# Patient Record
(demographics unavailable — no encounter records)

---

## 2025-04-30 NOTE — PAST MEDICAL HISTORY
[Menarche Age ____] : age at menarche was [unfilled] [Total Preg ___] : G[unfilled] [Live Births ___] : P[unfilled]  [Age At Live Birth ___] : Age at live birth: [unfilled] [Definite ___ (Date)] : the last menstrual period was [unfilled] [History of Hormone Replacement Treatment] : has no history of hormone replacement treatment [FreeTextEntry2] : son, daughter [FreeTextEntry7] : x2 yrs. [FreeTextEntry8] : x10 mo.

## 2025-04-30 NOTE — HISTORY OF PRESENT ILLNESS
[FreeTextEntry1] : This is a 45 year old  premenopausal female who was found to have an abnormality on her annual right mammogram.  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  She had the same area biopsied in 2016 with benign results.  She does occasional BSE and hasn't felt anything.    There is no family history of any cancer.   LYNN risk assessment (if entering )  10 year 9.5%  Lifetime= 43.5%

## 2025-04-30 NOTE — CONSULT LETTER
[Dear  ___] : Dear ~LASHANDA, [Consult Letter:] : I had the pleasure of evaluating your patient, [unfilled]. [Sincerely,] : Sincerely, [DrAshley  ___] : Dr. FERNANDO

## 2025-04-30 NOTE — DATA REVIEWED
[FreeTextEntry1] : Bilateral mammogram ([facility]) 4/1/2025:  Wing Clip in right breast with associated stable architectural distortion.  Additional imaging advised.  Right mammogram 4/17/2025:  Upper central mammographic distortion with a likely sonographic correlate at 12:00.  Stereotactic biopsy advised.  Right breast ultrasound 4/17/2025:  12N2 ill-defined hypoechoic area similar to 12/2016 exam, likely corresponds to mammographic distortion.   Stereotactic biopsy 4/18/2025:  Wing clip removed, new stoplight clip  Pathology 4/18/2025:  complex sclerosing lesion at least 4 mm with small glands entrapped in sclerotic/elastotic stroma and ductal epithelium apocrine proliferation with focal cytologic atypia, fibroadenomatoid changes, fibrocystic changes, microcalcifications in benign ductules.   (Imaging uploaded to [facility] and reviewed.)

## 2025-04-30 NOTE — DATA REVIEWED
[FreeTextEntry1] : Bilateral mammogram ([facility]) 4/1/2025:  Wing Clip in right breast with associated stable architectural distortion.  Additional imaging advised.  Right mammogram 4/17/2025:  Upper central mammographic distortion with a likely sonographic correlate at 12:00.  Stereotactic biopsy advised.  Right breast ultrasound 4/17/2025:  12N2 ill-defined hypoechoic area similar to 12/2016 exam, likely corresponds to mammographic distortion.   Stereotactic biopsy 4/18/2025:  Wing clip removed, new stoplight clip  Pathology 4/18/2025:  complex sclerosing lesion at least 4 mm with small glands entrapped in sclerotic/elastotic stroma and ductal epithelium apocrine proliferation with focal cytologic atypia, fibroadenomatoid changes, fibrocystic changes, microcalcifications in benign ductules.   (Imaging uploaded to [facility] and reviewed.)

## 2025-04-30 NOTE — PHYSICAL EXAM
[EOMI] : extra ocular movement intact [Sclera nonicteric] : sclera nonicteric [Supple] : supple [No Supraclavicular Adenopathy] : no supraclavicular adenopathy [No Cervical Adenopathy] : no cervical adenopathy [No Thyromegaly] : no thyromegaly [Clear to Auscultation Bilat] : clear to auscultation bilaterally [Normal Sinus Rhythm] : normal sinus rhythm [Examined in the supine and seated position] : examined in the supine and seated position [No dominant masses] : no dominant masses left breast [No Nipple Retraction] : no left nipple retraction [No Nipple Discharge] : no left nipple discharge [No Axillary Lymphadenopathy] : no left axillary lymphadenopathy [Soft] : abdomen soft [Not Tender] : non-tender [No Palpable Masses] : no abdominal mass palpated [Bra Size: ___] : Bra Size: [unfilled] [Normocephalic] : normocephalic [de-identified] : left breast slightly larger than right [de-identified] : Minimal ecchymoses 12:00 with slight postbiopsy density. [de-identified] : Low midline and low transverse scars [de-identified] : R hand dominant.

## 2025-04-30 NOTE — PHYSICAL EXAM
[EOMI] : extra ocular movement intact [Sclera nonicteric] : sclera nonicteric [Supple] : supple [No Supraclavicular Adenopathy] : no supraclavicular adenopathy [No Cervical Adenopathy] : no cervical adenopathy [No Thyromegaly] : no thyromegaly [Clear to Auscultation Bilat] : clear to auscultation bilaterally [Normal Sinus Rhythm] : normal sinus rhythm [Examined in the supine and seated position] : examined in the supine and seated position [No dominant masses] : no dominant masses left breast [No Nipple Retraction] : no left nipple retraction [No Nipple Discharge] : no left nipple discharge [No Axillary Lymphadenopathy] : no left axillary lymphadenopathy [Soft] : abdomen soft [Not Tender] : non-tender [No Palpable Masses] : no abdominal mass palpated [Bra Size: ___] : Bra Size: [unfilled] [Normocephalic] : normocephalic [de-identified] : left breast slightly larger than right [de-identified] : Minimal ecchymoses 12:00 with slight postbiopsy density. [de-identified] : Low midline and low transverse scars [de-identified] : R hand dominant.